# Patient Record
Sex: MALE | Race: BLACK OR AFRICAN AMERICAN | Employment: UNEMPLOYED | URBAN - METROPOLITAN AREA
[De-identification: names, ages, dates, MRNs, and addresses within clinical notes are randomized per-mention and may not be internally consistent; named-entity substitution may affect disease eponyms.]

---

## 2017-03-08 ENCOUNTER — OFFICE VISIT (OUTPATIENT)
Dept: CT IMAGING | Facility: CLINIC | Age: 32
End: 2017-03-08
Attending: INTERNAL MEDICINE
Payer: COMMERCIAL

## 2017-03-08 VITALS
SYSTOLIC BLOOD PRESSURE: 145 MMHG | TEMPERATURE: 98.1 F | OXYGEN SATURATION: 98 % | DIASTOLIC BLOOD PRESSURE: 61 MMHG | HEART RATE: 71 BPM

## 2017-03-08 DIAGNOSIS — A49.01 INFECTION DUE TO STAPHYLOCOCCUS AUREUS: Primary | ICD-10-CM

## 2017-03-08 LAB
CRP SERPL-MCNC: 14.8 MG/L (ref 0–8)
ERYTHROCYTE [SEDIMENTATION RATE] IN BLOOD BY WESTERGREN METHOD: 12 MM/H (ref 0–15)

## 2017-03-08 PROCEDURE — 85652 RBC SED RATE AUTOMATED: CPT | Performed by: INTERNAL MEDICINE

## 2017-03-08 PROCEDURE — 99211 OFF/OP EST MAY X REQ PHY/QHP: CPT | Mod: 25,ZF

## 2017-03-08 PROCEDURE — 99211 OFF/OP EST MAY X REQ PHY/QHP: CPT

## 2017-03-08 PROCEDURE — 36415 COLL VENOUS BLD VENIPUNCTURE: CPT | Mod: ZF

## 2017-03-08 PROCEDURE — 86140 C-REACTIVE PROTEIN: CPT | Performed by: INTERNAL MEDICINE

## 2017-03-08 ASSESSMENT — PAIN SCALES - GENERAL: PAINLEVEL: NO PAIN (0)

## 2017-03-08 NOTE — MR AVS SNAPSHOT
After Visit Summary   3/8/2017    Suraj Dobbins    MRN: [id number]           Patient Information     Date Of Birth          1985        Visit Information        Provider Department      3/8/2017 10:45 AM Gretchen Rodarte MD; [employer] [facility], [place], Infectious Disease        Today's Diagnoses     Infection due to Staphylococcus aureus    -  1       Follow-ups after your visit        Follow-up notes from your care team     Return if symptoms worsen or fail to improve.      Your next 10 appointments already scheduled     Mar 09, 2017  2:10 PM CST   XR PELVIS AND HIP RIGHT 1 VIEW with [id number]   [facility] XRay ([facility])    [address] [place] [id number] [phone]           Please bring a list of your current medicines to your exam. (Include vitamins, minerals and over-thecounter medicines.) Leave your valuables at home.  Tell your doctor if there is a chance you may be pregnant.  You do not need to do anything special for this exam.            Mar 09, 2017  2:15 PM CST   Return Visit with Yohan Pantoja MD, [employer]   [facility] ([facility])    [address] [place] [id number] [phone]              Who to contact     Please call your clinic at [phone] to:    Ask questions about your health    Make or cancel appointments    Discuss your medicines    Learn about your test results    Speak to your doctor   If you have compliments or concerns about an experience at your clinic, or if you wish to file a complaint, please contact [facility] Patient Relations at [phone] or email us at [email or web address]         Additional Information About Your Visit        MyChart Information     Holvi is an electronic gateway that provides easy, online access to your medical records. With  BRANDiD - Shop. Like a Man.hart, you can request a clinic appointment, read your test results, renew a prescription or communicate with your care team.     To sign up for [employer] visit the website at [email or web address]   You will be asked to enter the access code listed below, as well as some personal information. Please follow the directions to create your username and password.     Your access code is: [email or web address]  Expires: 2017  1:39 PM     Your access code will  in 90 days. If you need help or a new code, please contact your [facility] or call [phone] for assistance.        Care EveryWhere ID     This is your Care EveryWhere ID. This could be used by other organizations to access your [place] medical records  [id number]        Your Vitals Were     Pulse Temperature Pulse Oximetry             71 98.1  F (36.7  C) (Oral) 98%          Blood Pressure from Last 3 Encounters:   17 145/61   16 122/69   16 129/86    Weight from Last 3 Encounters:   16 69.9 kg (154 lb)   16 67.1 kg (148 lb)   16 70.3 kg (155 lb)              We Performed the Following     CRP inflammation     Erythrocyte sedimentation rate auto        Primary Care Provider Office Phone # Fax #    Alessandro Syed [phone]       [place] [address]        Thank you!     Thank you for choosing [facility], INFECTIOUS DISEASE  for your care. Our goal is always to provide you with excellent care. Hearing back from our patients is one way we can continue to improve our services. Please take a few minutes to complete the written survey that you may receive in the mail after your visit with us. Thank you!             Your Updated Medication List - Protect others around you: Learn how to safely use, store and throw away your medicines at www.disposemymeds.org.          This list is accurate as of: 3/8/17  3:47 PM.   Always use your most recent med list.                   Brand Name Dispense Instructions for use    loratadine 10 MG tablet    CLARITIN    30 tablet    Take 1 tablet (10 mg) by mouth daily

## 2017-03-08 NOTE — PROGRESS NOTES
ID Clinic Follow Up note    Assessment:  1. 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     His infection seems to be stable. His ESR is normal and CRP is near normal - notably both are stable after he stopped abx since his last visit. His perception is that the bacteria that caused his wound infection had been present chronically and I am inclined to agree. Fortunately his infection seems to have resolved, though I counseled him there is no way to be certain of this. In terms of proceeding with another corrective surgery - it seems reasonable to consider at this point as he has been off antibiotics for about 6 months and his infection occurred over a year ago in 1/2016. His CRP is up a little bit - unclear significance.     Imaging pre-operatively combined with cultures at the time of surgery, looking specifically for organisms that grew previously, may be helpful. Would also re-check CRP in 2-4 weeks to assess trend.    Plan:  -continue off antibiotics  -re-eheck CRP and ERS in 2-4 weeks    Visit length 25 min, >50% counseling/care coordination    Gretchen Rodarte MD  pgr [phone]      HPI:  Mr. Dobbins is a 32 y/o man who sustained a devastating R hip injury as a child in [facility]. Her reports this was chronically painful. He ultimately underwent complex femoral revision osetotomy on 12/7/15. This was complicated by infection requiring I/D that revealed MSSA on 1/19/16 and 1/21/16 and also P acnes and Actinomyces on 2/7/16. He was treated with IV therapy (nafcillin) and ultimately transitioned to oral suppressive minocycline and rifampin. He was last seen by Dr. Arciniega on 9/21. At that time his inflammatory markers were approaching normal and he felt well. His suppressive therapy was stopped shortly after that visit.     He  now presents for routine follow-up. I last saw him in 9/2016. History is obtained with the assistance of an . In the interim, Mr. Dobbins has done well. He reports no pain and no drainage from the hip site.     We talked at length about the fact that persistent infection can never be completely excluded, but arguing against it in this case are his significantly improved symptoms  - he feels better now than he even did for years pre-operatively - and his stable inflammatory markers.    Past Medical History   Diagnosis Date     Deformity of right lower extremity      Tuberculosis of hip (H)      Underwent 8 months of therapy in an [place] refugee [place]     Social History     Social History     Marital status: Single     Spouse name: N/A     Number of children: N/A     Years of education: N/A     Occupational History     Not on file.     Social History Main Topics     Smoking status: Never Smoker     Smokeless tobacco: Never Used     Alcohol use No     Drug use: No     Sexual activity: Not on file     Other Topics Concern     Not on file     Social History Narrative     No family history on file.      Current Outpatient Prescriptions on File Prior to Visit:  loratadine (CLARITIN) 10 MG tablet Take 1 tablet (10 mg) by mouth daily     No current facility-administered medications on file prior to visit.     Exam:  /61  Pulse 71  Temp 98.1  F (36.7  C) (Oral)  SpO2 98%  Awake, alert, pleasant   HR regular  Breathing normal  No skin rash  R hip incisions examined, no erythema, induration, or incisional dehisence  Marked leg length discrepancy with R leg at least 8in shorter than the R.    Data:  Results for CHANDRIKA DOBBINS (MRN [id number]) as of 3/8/2017 15:45   Ref. Range 1/26/2016 06:20 2/9/2016 16:45 2/10/2016 02:14 2/11/2016 06:30 2/15/2016 02:06 2/22/2016 02:15 2/26/2016 06:20 3/4/2016 06:35 3/5/2016 07:00 3/6/2016 06:55 3/11/2016 05:44 3/23/2016 11:40 6/22/2016 15:00 9/21/2016 15:10 12/21/2016 11:40  3/8/2017 11:57   CRP Inflammation Latest Ref Range: 0.0 - 8.0 mg/L 60.5 (H) 40.3 (H) 34.4 (H) 30.5 (H) 41.8 (H) 22.8 (H) 31.5 (H) 22.1 (H) 22.1 (H) 20.0 (H) 18.0 (H) 19.3 (H) 10.7 (H) 11.9 (H) 12.1 (H) 14.8 (H)     Results for CHANDRIKA RETANA (MRN [id number]) as of 3/8/2017 15:45   Ref. Range 1/16/2016 02:38 1/20/2016 05:59 3/4/2016 06:35 3/11/2016 05:44 6/22/2016 15:00 9/21/2016 15:10 12/21/2016 11:40 3/8/2017 11:57   Sed Rate Latest Ref Range: 0 - 15 mm/h 108 (H) 122 (H) 42 (H) 24 (H) 14 10 12 12

## 2017-03-08 NOTE — NURSING NOTE
Patient is in clinic today for a follow up with Dr. Rodarte for his right foot. Patient states pain level is at a 0 out of 10. Vitals taken, medication and allergies were reviewed. Patient state that he feels it's getting better. An  is also present at today's visit.    Katie Retana CMA

## 2017-03-09 ENCOUNTER — OFFICE VISIT (OUTPATIENT)
Dept: ORTHOPEDICS | Facility: CLINIC | Age: 32
End: 2017-03-09

## 2017-03-09 DIAGNOSIS — M21.70 LEG LENGTH DIFFERENCE, ACQUIRED: Primary | ICD-10-CM

## 2017-03-09 NOTE — PROGRESS NOTES
HISTORY OF PRESENT ILLNESS:  Suraj is a pleasant 32-year-old male who returns approximately 14 months status post right femoral osteotomy.  He is, in fact, doing very well.  He continues to have a mild pain in the right foot associated with correction of his extreme equinovarus.  However, otherwise he is doing well.  Pain is minimal and he is actually able to ambulate nearly independently with a single cane.      PHYSICAL EXAMINATION:  Well-nourished male, ambulating with assistance of a single crutch.  Surgical incisions are very well-healed and he is neurovascularly intact distally.      X-rays show a well-healed callus at the fracture site.      ASSESSMENT:  Status post right femoral osteotomy.        PLAN:  Suraj is doing very well status post the above-mentioned procedure.  We did discuss the sequelae of his significant deformity of his right knee.  The patient is not a candidate at the current time for a right total knee arthroplasty given he is having no pain.  We did discuss that he may be a candidate for a right total knee in the future when he does develop pain and this will simply correct range of motion as well as pain.  He will follow up in the future on an as needed basis.       Answers for HPI/ROS submitted by the patient on 3/9/2017   General Symptoms: No  Skin Symptoms: No  HENT Symptoms: No  EYE SYMPTOMS: No  HEART SYMPTOMS: No  LUNG SYMPTOMS: No  INTESTINAL SYMPTOMS: No  URINARY SYMPTOMS: No  REPRODUCTIVE SYMPTOMS: No  SKELETAL SYMPTOMS: No  BLOOD SYMPTOMS: No  NERVOUS SYSTEM SYMPTOMS: No  MENTAL HEALTH SYMPTOMS: No

## 2017-03-09 NOTE — LETTER
3/9/2017      RE: Suraj ROGERS Mu  [address]    [place] [address]       HISTORY OF PRESENT ILLNESS:  Suraj is a pleasant 32-year-old male who returns approximately 14 months status post right femoral osteotomy.  He is, in fact, doing very well.  He continues to have a mild pain in the right foot associated with correction of his extreme equinovarus.  However, otherwise he is doing well.  Pain is minimal and he is actually able to ambulate nearly independently with a single cane.      PHYSICAL EXAMINATION:  Well-nourished male, ambulating with assistance of a single crutch.  Surgical incisions are very well-healed and he is neurovascularly intact distally.      X-rays show a well-healed callus at the fracture site.      ASSESSMENT:  Status post right femoral osteotomy.        PLAN:  Suraj is doing very well status post the above-mentioned procedure.  We did discuss the sequelae of his significant deformity of his right knee.  The patient is not a candidate at the current time for a right total knee arthroplasty given he is having no pain.  We did discuss that he may be a candidate for a right total knee in the future when he does develop pain and this will simply correct range of motion as well as pain.  He will follow up in the future on an as needed basis.         Yohan Pantoja MD

## 2017-03-09 NOTE — MR AVS SNAPSHOT
After Visit Summary   3/9/2017    Suraj ROGERS Mu    MRN: [id number]           Patient Information     Date Of Birth          1985        Visit Information        Provider Department      3/9/2017 2:15 PM Yohan Pantoja MD; [employer] [facility]        Today's Diagnoses     Leg length difference, acquired    -  1       Follow-ups after your visit        Who to contact     Please call your clinic at [phone] to:    Ask questions about your health    Make or cancel appointments    Discuss your medicines    Learn about your test results    Speak to your doctor   If you have compliments or concerns about an experience at your clinic, or if you wish to file a complaint, please contact [facility] Patient Relations at [phone] or email us at [email or web address]         Additional Information About Your Visit        MyChart Information     OpenDoort is an electronic gateway that provides easy, online access to your medical records. With Experience Headphones, you can request a clinic appointment, read your test results, renew a prescription or communicate with your care team.     To sign up for OpenDoort visit the website at [email or web address]   You will be asked to enter the access code listed below, as well as some personal information. Please follow the directions to create your username and password.     Your access code is: [email or web address]  Expires: 2017  9:59 AM     Your access code will  in 90 days. If you need help or a new code, please contact your [facility] or call [phone] for assistance.        Care EveryWhere ID     This is your Care EveryWhere ID. This could be used by other organizations to access your [place] medical records  [id number]         Blood Pressure from Last 3 Encounters:   17 145/61   16 122/69   16 129/86    Weight from Last 3 Encounters:    09/21/16 69.9 kg (154 lb)   06/22/16 67.1 kg (148 lb)   03/23/16 70.3 kg (155 lb)              Today, you had the following     No orders found for display       Primary Care Provider Office Phone # Fax #    Alessandro Syed [phone]       [place] [address]        Equal Access to Services     ANGELA RODRIGUEZ : Hadii aad ku hadasho Soomaali, waaxda luqadaha, qaybta kaalmada adeegyada, waxay abdiin hayhannahn luc taylor . So [facility] [phone].    ATENCIÓN: Si habla jose, tiene a vasquez disposición servicios gratuitos de asistencia lingüística. Llame al [phone].    We comply with applicable federal civil rights laws and [place] laws. We do not discriminate on the basis of race, color, national origin, age, disability sex, sexual orientation or gender identity.            Thank you!     Thank you for choosing [facility]  for your care. Our goal is always to provide you with excellent care. Hearing back from our patients is one way we can continue to improve our services. Please take a few minutes to complete the written survey that you may receive in the mail after your visit with us. Thank you!             Your Updated Medication List - Protect others around you: Learn how to safely use, store and throw away your medicines at www.disposemymeds.org.          This list is accurate as of: 3/9/17 11:59 PM.  Always use your most recent med list.                   Brand Name Dispense Instructions for use Diagnosis    loratadine 10 MG tablet    CLARITIN    30 tablet    Take 1 tablet (10 mg) by mouth daily    Seasonal allergic rhinitis

## 2017-03-09 NOTE — LETTER
3/9/2017       RE: Suraj Dobbins  [address]    [place] [address]     Dear Colleague,    Thank you for referring your patient, Suraj Dobbins, to the [facility]. Please see a copy of my visit note below.     Dictation on: 03/09/2017  3:33 PM by: BREANNA KNIGHT [[email or web address]]       Answers for HPI/ROS submitted by the patient on 3/9/2017   General Symptoms: No  Skin Symptoms: No  HENT Symptoms: No  EYE SYMPTOMS: No  HEART SYMPTOMS: No  LUNG SYMPTOMS: No  INTESTINAL SYMPTOMS: No  URINARY SYMPTOMS: No  REPRODUCTIVE SYMPTOMS: No  SKELETAL SYMPTOMS: No  BLOOD SYMPTOMS: No  NERVOUS SYSTEM SYMPTOMS: No  MENTAL HEALTH SYMPTOMS: No      HISTORY OF PRESENT ILLNESS:  Suraj is a pleasant 32-year-old male who returns approximately 14 months status post right femoral osteotomy.  He is, in fact, doing very well.  He continues to have a mild pain in the right foot associated with correction of his extreme equinovarus.  However, otherwise he is doing well.  Pain is minimal and he is actually able to ambulate nearly independently with a single cane.      PHYSICAL EXAMINATION:  Well-nourished male, ambulating with assistance of a single crutch.  Surgical incisions are very well-healed and he is neurovascularly intact distally.      X-rays show a well-healed callus at the fracture site.      ASSESSMENT:  Status post right femoral osteotomy.        PLAN:  Suraj is doing very well status post the above-mentioned procedure.  We did discuss the sequelae of his significant deformity of his right knee.  The patient is not a candidate at the current time for a right total knee arthroplasty given he is having no pain.  We did discuss that he may be a candidate for a right total knee in the future when he does develop pain and this will simply correct range of motion as well as pain.  He will follow up in the future on an as needed basis.      Again,  thank you for allowing me to participate in the care of your patient.      Sincerely,    Yohan Pantoja MD

## 2017-08-15 ENCOUNTER — TELEPHONE (OUTPATIENT)
Dept: ORTHOPEDICS | Facility: CLINIC | Age: 32
End: 2017-08-15

## 2017-08-15 NOTE — TELEPHONE ENCOUNTER
View2Gether message from the call center:    Patient states that he is in extreme pain and needs to be seen sooner with Dr. Pantoja than what was available.  Pt is now scheduled for 10/05/2017.  I was not able to Triage pt, hold was 10 minutes.  Pt would like a call back tomorrow, and indicates that transportation will also have to be arranged.  His number is [phone].     Pt was phoned and message left that he can see Gabi Dc NP for Dr Pantoja and she is available Mondays and Thursdays.  The message included the scheduling line phone number.  Adeline Pittman RN

## 2017-08-23 ENCOUNTER — TELEPHONE (OUTPATIENT)
Dept: ORTHOPEDICS | Facility: CLINIC | Age: 32
End: 2017-08-23

## 2017-08-23 NOTE — TELEPHONE ENCOUNTER
"Suraj called,  joined call.  Suraj states he wants appointment with Dr Pantoja, informed him he is scheduled 10/05.  Offered patient appointment with Gabi Dc NP, he did not want to see her.  \"Tell him I just need physical therapy before I see him\".  I informed Suraj an Order can not be placed until he is assessed.    Updated forwarded to Dr Pantoja's cc BARRON Lr.  Courtney Crane RN 9:45 AM 8/23/2017        "

## 2017-08-23 NOTE — TELEPHONE ENCOUNTER
Suraj was phoned regarding his request for [employer] transportation to be set up for his 10/5/17 appointment AND his earlier request for P.T order.   Pt explained that he had continued right leg pain, he wanted referral for neurologist and we discussed the need for Dr Pantoja to assess if he needs the referral.  When I asked Suraj about P.T, he said he wanted to wait until he saw Dr Pantoja.  [employer] transportation was phoned [phone], they will not schedule October transportation until 9/26/17, and it must be at least 48 hours before the needed appt.  RN will call during that window to assure Suraj has transportation to clinic.

## 2017-10-03 DIAGNOSIS — M25.551 HIP PAIN, RIGHT: Primary | ICD-10-CM

## 2018-03-28 ENCOUNTER — OFFICE VISIT (OUTPATIENT)
Dept: INTERNAL MEDICINE | Facility: CLINIC | Age: 33
End: 2018-03-28
Payer: MEDICAID

## 2018-03-28 ENCOUNTER — RADIANT APPOINTMENT (OUTPATIENT)
Dept: GENERAL RADIOLOGY | Facility: CLINIC | Age: 33
End: 2018-03-28
Attending: INTERNAL MEDICINE
Payer: MEDICAID

## 2018-03-28 VITALS
DIASTOLIC BLOOD PRESSURE: 70 MMHG | WEIGHT: 240 LBS | HEART RATE: 73 BPM | OXYGEN SATURATION: 97 % | BODY MASS INDEX: 29.22 KG/M2 | HEIGHT: 76 IN | SYSTOLIC BLOOD PRESSURE: 120 MMHG | TEMPERATURE: 97.9 F

## 2018-03-28 DIAGNOSIS — Z23 NEED FOR TD VACCINE: ICD-10-CM

## 2018-03-28 DIAGNOSIS — M79.89 FINGER SWELLING: ICD-10-CM

## 2018-03-28 DIAGNOSIS — Z00.00 ENCOUNTER FOR ROUTINE ADULT HEALTH EXAMINATION WITHOUT ABNORMAL FINDINGS: Primary | ICD-10-CM

## 2018-03-28 DIAGNOSIS — Z11.1 SCREENING EXAMINATION FOR PULMONARY TUBERCULOSIS: ICD-10-CM

## 2018-03-28 DIAGNOSIS — Z23 NEED FOR TDAP VACCINATION: ICD-10-CM

## 2018-03-28 LAB — HGB BLD-MCNC: 13.8 G/DL (ref 13.3–17.7)

## 2018-03-28 PROCEDURE — 85018 HEMOGLOBIN: CPT | Performed by: INTERNAL MEDICINE

## 2018-03-28 PROCEDURE — 80053 COMPREHEN METABOLIC PANEL: CPT | Performed by: INTERNAL MEDICINE

## 2018-03-28 PROCEDURE — 73140 X-RAY EXAM OF FINGER(S): CPT | Mod: RT

## 2018-03-28 PROCEDURE — 99385 PREV VISIT NEW AGE 18-39: CPT | Performed by: INTERNAL MEDICINE

## 2018-03-28 PROCEDURE — 86480 TB TEST CELL IMMUN MEASURE: CPT | Performed by: INTERNAL MEDICINE

## 2018-03-28 PROCEDURE — 90715 TDAP VACCINE 7 YRS/> IM: CPT | Performed by: INTERNAL MEDICINE

## 2018-03-28 PROCEDURE — 36415 COLL VENOUS BLD VENIPUNCTURE: CPT | Performed by: INTERNAL MEDICINE

## 2018-03-28 PROCEDURE — 80061 LIPID PANEL: CPT | Performed by: INTERNAL MEDICINE

## 2018-03-28 NOTE — PROGRESS NOTES
SUBJECTIVE:   CC: Ilan Douglas is an 33 year old male who presents for preventative health visit.     Healthy Habits:    Do you get at least three servings of calcium containing foods daily (dairy, green leafy vegetables, etc.)? yes    Amount of exercise or daily activities, outside of work: 4 day(s) per week    Problems taking medications regularly not applicable    Medication side effects: N/A    Have you had an eye exam in the past two years? Yes    Do you see a dentist twice per year? yes    Do you have sleep apnea, excessive snoring or daytime drowsiness?no     Pt also c/o hurting finger while playing basket ball in 10/17 , did not seek medical care at that time. Patient again injured the same finger about 4 weeks ago, has noticed slight swelling on the PIP joint and some tenderness. No tingling or numbness. Is able to bend.    Patient also needs physical and immunization forms filled for school.      Today's PHQ-2 Score: 0  PHQ-2 ( 1999 Pfizer) 3/28/2018   Q1: Little interest or pleasure in doing things 0   Q2: Feeling down, depressed or hopeless 0   PHQ-2 Score 0       Abuse: Current or Past(Physical, Sexual or Emotional)- No  Do you feel safe in your environment - Yes      History reviewed. No pertinent past medical history.    History reviewed. No pertinent surgical history.    No current outpatient prescriptions on file.       Family History   Problem Relation Age of Onset     Family history unknown: Yes       Social History   Substance Use Topics     Smoking status: Never Smoker     Smokeless tobacco: Never Used     Alcohol use No      If you drink alcohol do you typically have >3 drinks per day or >7 drinks per week? No                      Last PSA: No results found for: PSA    Reviewed orders with patient. Reviewed health maintenance and updated orders accordingly - Yes      Reviewed and updated as needed this visit by clinical staff  Tobacco  Allergies  Meds  Med Hx  Surg Hx  Fam Hx  Soc Hx     "    Reviewed and updated as needed this visit by Provider            ROS:  C: NEGATIVE for fever, chills, change in weight  I: NEGATIVE for worrisome rashes, moles or lesions  E: NEGATIVE for vision changes or irritation  ENT: NEGATIVE for ear, mouth and throat problems  R: NEGATIVE for significant cough or SOB  CV: NEGATIVE for chest pain, palpitations or peripheral edema  GI: NEGATIVE for nausea, abdominal pain, heartburn, or change in bowel habits   male: negative for dysuria, hematuria, decreased urinary stream, erectile dysfunction, urethral discharge  M: rt finger pain other wise NEGATIVE for significant arthralgias or myalgia  N: NEGATIVE for weakness, dizziness or paresthesias  P: NEGATIVE for changes in mood or affect    OBJECTIVE:   /70  Pulse 73  Temp 97.9  F (36.6  C) (Oral)  Ht 6' 4\" (1.93 m)  Wt 240 lb (108.9 kg)  SpO2 97%  BMI 29.21 kg/m2  EXAM:  GENERAL: healthy, alert and no distress  EYES: Eyes grossly normal to inspection, PERRL and conjunctivae and sclerae normal  HENT: ear canals and TM's normal, nose and mouth without ulcers or lesions  NECK: no adenopathy, no asymmetry, masses, or scars and thyroid normal to palpation  RESP: lungs clear to auscultation - no rales, rhonchi or wheezes  CV: regular rate and rhythm, normal S1 S2, no S3 or S4, no murmur, click or rub, no peripheral edema and peripheral pulses strong  ABDOMEN: soft, nontender, no hepatosplenomegaly, no masses and bowel sounds normal  MS: mild swelling and tenderness on PCP joint rt  ring finger , range of motion normal  EXT ;no edema, no cough, tenderness  NEURO: Normal strength and tone, mentation intact and speech normal  PSYCH: mentation appears normal, affect normal/bright    ASSESSMENT/PLAN:     (Z00.00) Encounter for routine adult health examination without abnormal findings  (primary encounter diagnosis)  Plan: Hemoglobin, Comprehensive metabolic panel,         Lipid panel reflex to direct LDL Fasting        " "  (Z11.1) Screening examination for pulmonary tuberculosis  Plan: M Tuberculosis by Quantiferon          (M79.89) Finger swelling  Plan: XR Finger Right G/E 2 Views          (Z23) Need for Tdap vaccination  Plan: TDAP VACCINE (ADACEL)            COUNSELING:  Reviewed preventive health counseling, as reflected in patient instructions       Regular exercise       Healthy diet/nutrition       Immunizations    Vaccinated for: TDAP           reports that he has never smoked. He has never used smokeless tobacco.    Estimated body mass index is 29.21 kg/(m^2) as calculated from the following:    Height as of this encounter: 6' 4\" (1.93 m).    Weight as of this encounter: 240 lb (108.9 kg).   Weight management plan: Discussed healthy diet and exercise guidelines and patient will follow up in 12 months in clinic to re-evaluate.    Counseling Resources:  ATP IV Guidelines  Pooled Cohorts Equation Calculator  FRAX Risk Assessment  ICSI Preventive Guidelines  Dietary Guidelines for Americans, 2010  USDA's MyPlate  ASA Prophylaxis  Lung CA Screening    Venita Llamas MD  [facility]  "

## 2018-03-29 LAB
ALBUMIN SERPL-MCNC: 4.2 G/DL (ref 3.4–5)
ALP SERPL-CCNC: 85 U/L (ref 40–150)
ALT SERPL W P-5'-P-CCNC: 47 U/L (ref 0–70)
ANION GAP SERPL CALCULATED.3IONS-SCNC: 6 MMOL/L (ref 3–14)
AST SERPL W P-5'-P-CCNC: 28 U/L (ref 0–45)
BILIRUB SERPL-MCNC: 0.3 MG/DL (ref 0.2–1.3)
BUN SERPL-MCNC: 18 MG/DL (ref 7–30)
CALCIUM SERPL-MCNC: 8.7 MG/DL (ref 8.5–10.1)
CHLORIDE SERPL-SCNC: 106 MMOL/L (ref 94–109)
CHOLEST SERPL-MCNC: 184 MG/DL
CO2 SERPL-SCNC: 26 MMOL/L (ref 20–32)
CREAT SERPL-MCNC: 0.9 MG/DL (ref 0.66–1.25)
GFR SERPL CREATININE-BSD FRML MDRD: >90 ML/MIN/1.7M2
GLUCOSE SERPL-MCNC: 86 MG/DL (ref 70–99)
HDLC SERPL-MCNC: 44 MG/DL
LDLC SERPL CALC-MCNC: 119 MG/DL
NONHDLC SERPL-MCNC: 140 MG/DL
POTASSIUM SERPL-SCNC: 3.8 MMOL/L (ref 3.4–5.3)
PROT SERPL-MCNC: 7.3 G/DL (ref 6.8–8.8)
SODIUM SERPL-SCNC: 138 MMOL/L (ref 133–144)
TRIGL SERPL-MCNC: 107 MG/DL

## 2018-03-30 LAB
M TB TUBERC IFN-G BLD QL: NEGATIVE
M TB TUBERC IFN-G/MITOGEN IGNF BLD: 0.01 IU/ML

## 2018-04-03 ENCOUNTER — TELEPHONE (OUTPATIENT)
Dept: INTERNAL MEDICINE | Facility: CLINIC | Age: 33
End: 2018-04-03

## 2018-04-03 NOTE — TELEPHONE ENCOUNTER
Forms for [facility] completed and mailed to patient home.  Left message to advise patient.  Copy sent to scan into [facility].

## 2018-09-27 ENCOUNTER — OFFICE VISIT (OUTPATIENT)
Dept: INTERNAL MEDICINE | Facility: CLINIC | Age: 33
End: 2018-09-27
Payer: COMMERCIAL

## 2018-09-27 ENCOUNTER — OFFICE VISIT (OUTPATIENT)
Dept: ORTHOPEDICS | Facility: CLINIC | Age: 33
End: 2018-09-27
Payer: COMMERCIAL

## 2018-09-27 VITALS
TEMPERATURE: 98.6 F | SYSTOLIC BLOOD PRESSURE: 108 MMHG | BODY MASS INDEX: 28.49 KG/M2 | RESPIRATION RATE: 12 BRPM | HEART RATE: 72 BPM | OXYGEN SATURATION: 98 % | HEIGHT: 76 IN | WEIGHT: 234 LBS | DIASTOLIC BLOOD PRESSURE: 62 MMHG

## 2018-09-27 VITALS — WEIGHT: 234 LBS | SYSTOLIC BLOOD PRESSURE: 120 MMHG | BODY MASS INDEX: 28.48 KG/M2 | DIASTOLIC BLOOD PRESSURE: 76 MMHG

## 2018-09-27 DIAGNOSIS — J06.9 VIRAL UPPER RESPIRATORY TRACT INFECTION: Primary | ICD-10-CM

## 2018-09-27 DIAGNOSIS — S63.638A: Primary | ICD-10-CM

## 2018-09-27 PROCEDURE — 99213 OFFICE O/P EST LOW 20 MIN: CPT | Performed by: NURSE PRACTITIONER

## 2018-09-27 PROCEDURE — 99203 OFFICE O/P NEW LOW 30 MIN: CPT | Performed by: ORTHOPAEDIC SURGERY

## 2018-09-27 NOTE — PROGRESS NOTES
"  SUBJECTIVE:   Ilan Douglas is a 33 year old male who presents to clinic today for the following health issues:    Cough, headache, runny nose, some sore throat. Sx present x 3 or 4 days.   Body ache   Fever - has not checked temperature but felt very warm   Ear pain at times  Sneezing   Cough - non productive    Helps to drink warm fluids     Lower leg pain in back of leg   Clicking noise when he gets up            Problem list and histories reviewed & adjusted, as indicated.  Additional history: as documented    There is no problem list on file for this patient.    No past surgical history on file.    Social History   Substance Use Topics     Smoking status: Never Smoker     Smokeless tobacco: Never Used     Alcohol use No     Family History   Problem Relation Age of Onset     Family history unknown: Yes           Reviewed and updated as needed this visit by clinical staff  Tobacco  Allergies  Meds  Soc Hx      Reviewed and updated as needed this visit by Provider         ROS:  Constitutional, HEENT, cardiovascular, pulmonary, gi and gu systems are negative, except as otherwise noted.    OBJECTIVE:     /62  Pulse 72  Temp 98.6  F (37  C) (Oral)  Resp 12  Ht 6' 4\" (1.93 m)  Wt 234 lb (106.1 kg)  SpO2 98%  BMI 28.48 kg/m2  Body mass index is 28.48 kg/(m^2).  GENERAL: alert and no distress- no cough during entire visit   HENT: ear canals and TM's normal, nose and mouth without ulcers or lesions  RESP: lungs clear to auscultation - no rales, rhonchi or wheezes  CV: regular rate and rhythm  MS: no gross musculoskeletal defects noted, no edema  NEURO: Normal strength and tone, mentation intact and speech normal  PSYCH: mentation appears normal, affect normal/bright    Diagnostic Test Results:  none     ASSESSMENT/PLAN:             1. Viral upper respiratory tract infection        Patient Instructions   Keep fluids up     ibuprofen or tylenol as needed for comfort      AUREA Burns " [facility]

## 2018-09-27 NOTE — PROGRESS NOTES
HISTORY OF PRESENT ILLNESS:    Ilan Douglas is a 33 year old male who is seen in consultation at the request of Dr. Llamas for right 4th finger swelling and pain. Patient reports he injured his right 4th finger while playing basketball last spring, he noted he had X-rays done after the initial injury. He notes the PIP joint is  and swollen. Patient states he is having mild pain in the right thumb, that started this summer.  He states the thumb is not too sore and that he has full ROM of the thumb.   Patient would also like his left lower leg evaluated, he notes he has had multiple ankle sprains and has lateral lower leg pain.  Patient states the pain comes and goes. Patient states it swells intermittently, and with ice the swelling will subside. The area is tender to the touch. Patient is very active playing recreational basketball and soccer.   Treatments tried to this point: ice, coban  Orthopedic PMH: none   No past medical history on file.    No past surgical history on file.    Family History   Problem Relation Age of Onset     Family history unknown: Yes       Social History     Social History     Marital status:      Spouse name: N/A     Number of children: N/A     Years of education: N/A     Occupational History     Not on file.     Social History Main Topics     Smoking status: Never Smoker     Smokeless tobacco: Never Used     Alcohol use No     Drug use: No     Sexual activity: Yes     Partners: Female     Other Topics Concern     Not on file     Social History Narrative       No current outpatient prescriptions on file.       No Known Allergies    REVIEW OF SYSTEMS:  CONSTITUTIONAL:  NEGATIVE for fever, chills, change in weight  INTEGUMENTARY/SKIN:  NEGATIVE for worrisome rashes, moles or lesions  EYES:  NEGATIVE for vision changes or irritation  ENT/MOUTH:  NEGATIVE for ear, mouth and throat problems  RESP:  NEGATIVE for significant cough or SOB  BREAST:  NEGATIVE for masses,  tenderness or discharge  CV:  NEGATIVE for chest pain, palpitations or peripheral edema  GI:  NEGATIVE for nausea, abdominal pain, heartburn, or change in bowel habits  :  Negative   MUSCULOSKELETAL:  See HPI above  NEURO:  NEGATIVE for weakness, dizziness or paresthesias  ENDOCRINE:  NEGATIVE for temperature intolerance, skin/hair changes  HEME/ALLERGY/IMMUNE:  NEGATIVE for bleeding problems  PSYCHIATRIC:  NEGATIVE for changes in mood or affect      PHYSICAL EXAM:  /76 (BP Location: Right arm, Patient Position: Chair, Cuff Size: Adult Large)  Wt 234 lb (106.1 kg)  BMI 28.48 kg/m2  Body mass index is 28.48 kg/(m^2).   GENERAL APPEARANCE: healthy, alert and no distress   SKIN: no suspicious lesions or rashes  NEURO: Normal strength and tone, mentation intact and speech normal  VASCULAR: Good pulses, and capillary refill   LYMPH: no lymphadenopathy   PSYCH:  mentation appears normal and affect normal/bright    MSK:  Examination of the hands reveal ring finger PIP joint swelling.  There is no ligamentous laxity.  He has full range of motion.  CMS is intact to his fingertips.     ASSESSMENT / PLAN: Sprain bilateral ring fingers from playing basketball.  We will treat this symptomatically.      Imaging Interpretation:         Eric Nguyen MD  Department of Orthopedic Surgery

## 2018-09-27 NOTE — LETTER
9/27/2018         RE: Il[facility]  [address] [facility] [address]        Dear Colleague,    Thank you for referring your patient, Ilan Douglas, to the [facility]. Please see a copy of my visit note below.    HISTORY OF PRESENT ILLNESS:    Ilan Douglas is a 33 year old male who is seen in consultation at the request of Dr. Llamas for right 4th finger swelling and pain. Patient reports he injured his right 4th finger while playing basketball last spring, he noted he had X-rays done after the initial injury. He notes the PIP joint is  and swollen. Patient states he is having mild pain in the right thumb, that started this summer.  He states the thumb is not too sore and that he has full ROM of the thumb.   Patient would also like his left lower leg evaluated, he notes he has had multiple ankle sprains and has lateral lower leg pain.  Patient states the pain comes and goes. Patient states it swells intermittently, and with ice the swelling will subside. The area is tender to the touch. Patient is very active playing recreational basketball and soccer.   Treatments tried to this point: ice, coban  Orthopedic PMH: none   No past medical history on file.    No past surgical history on file.    Family History   Problem Relation Age of Onset     Family history unknown: Yes       Social History     Social History     Marital status:      Spouse name: N/A     Number of children: N/A     Years of education: N/A     Occupational History     Not on file.     Social History Main Topics     Smoking status: Never Smoker     Smokeless tobacco: Never Used     Alcohol use No     Drug use: No     Sexual activity: Yes     Partners: Female     Other Topics Concern     Not on file     Social History Narrative       No current outpatient prescriptions on file.       No Known Allergies    REVIEW OF SYSTEMS:  CONSTITUTIONAL:  NEGATIVE for fever, chills, change in  weight  INTEGUMENTARY/SKIN:  NEGATIVE for worrisome rashes, moles or lesions  EYES:  NEGATIVE for vision changes or irritation  ENT/MOUTH:  NEGATIVE for ear, mouth and throat problems  RESP:  NEGATIVE for significant cough or SOB  BREAST:  NEGATIVE for masses, tenderness or discharge  CV:  NEGATIVE for chest pain, palpitations or peripheral edema  GI:  NEGATIVE for nausea, abdominal pain, heartburn, or change in bowel habits  :  Negative   MUSCULOSKELETAL:  See HPI above  NEURO:  NEGATIVE for weakness, dizziness or paresthesias  ENDOCRINE:  NEGATIVE for temperature intolerance, skin/hair changes  HEME/ALLERGY/IMMUNE:  NEGATIVE for bleeding problems  PSYCHIATRIC:  NEGATIVE for changes in mood or affect      PHYSICAL EXAM:  /76 (BP Location: Right arm, Patient Position: Chair, Cuff Size: Adult Large)  Wt 234 lb (106.1 kg)  BMI 28.48 kg/m2  Body mass index is 28.48 kg/(m^2).   GENERAL APPEARANCE: healthy, alert and no distress   SKIN: no suspicious lesions or rashes  NEURO: Normal strength and tone, mentation intact and speech normal  VASCULAR: Good pulses, and capillary refill   LYMPH: no lymphadenopathy   PSYCH:  mentation appears normal and affect normal/bright    MSK:  Examination of the hands reveal ring finger PIP joint swelling.  There is no ligamentous laxity.  He has full range of motion.  CMS is intact to his fingertips.     ASSESSMENT / PLAN: Sprain bilateral ring fingers from playing basketball.  We will treat this symptomatically.      Imaging Interpretation:         Eric Nguyen MD  Department of Orthopedic Surgery          Again, thank you for allowing me to participate in the care of your patient.        Sincerely,        Sukhwinder Nguyen MD

## 2018-09-27 NOTE — MR AVS SNAPSHOT
After Visit Summary   9/27/2018    Ilan Douglas    MRN: [id number]           Patient Information     Date Of Birth          1985        Visit Information        Provider Department      9/27/2018 2:00 PM Sukhwinder Nguyen MD [facility] ORTHOPEDIC SURGERY        Today's Diagnoses     Sprain of interphalangeal joint of ring finger, unspecified laterality, initial encounter    -  1       Follow-ups after your visit        Who to contact     If you have questions or need follow up information about today's clinic visit or your schedule please contact [facility] ORTHOPEDIC SURGERY directly at [phone].  Normal or non-critical lab and imaging results will be communicated to you by MyChart, letter or phone within 4 business days after the clinic has received the results. If you do not hear from us within 7 days, please contact the clinic through MyChart or phone. If you have a critical or abnormal lab result, we will notify you by phone as soon as possible.  Submit refill requests through [employer] or call your pharmacy and they will forward the refill request to us. Please allow 3 business days for your refill to be completed.          Additional Information About Your Visit        Care EveryWhere ID     This is your Care EveryWhere ID. This could be used by other organizations to access your [place] medical records  [id number]        Your Vitals Were     BMI (Body Mass Index)                   28.48 kg/m2            Blood Pressure from Last 3 Encounters:   09/27/18 120/76   09/27/18 108/62   03/28/18 120/70    Weight from Last 3 Encounters:   09/27/18 234 lb (106.1 kg)   09/27/18 234 lb (106.1 kg)   03/28/18 240 lb (108.9 kg)              Today, you had the following     No orders found for display       Primary Care Provider Office Phone # Fax #    Chinedunakumari PARAG Llamas [phone]       [address]  [facility] [address]        Equal Access to Services      [place] : Hadii aad alex sonia Ag, wakelseyda luqadaha, qaybta kaalmada roseannaaipaola, davide vasquezmaciejbela villanueva. So [facility] [place] [facility] [phone].    ATENCIÓN: Si habla español, tiene a marie disposición servicios gratuitos de asistencia lingüística. Llame al [phone].    We comply with applicable federal civil rights laws and [place] laws. We do not discriminate on the basis of race, color, national origin, age, disability, sex, sexual orientation, or gender identity.            Thank you!     Thank you for choosing [facility] ORTHOPEDIC SURGERY  for your care. Our goal is always to provide you with excellent care. Hearing back from our patients is one way we can continue to improve our services. Please take a few minutes to complete the written survey that you may receive in the mail after your visit with us. Thank you!             Your Updated Medication List - Protect others around you: Learn how to safely use, store and throw away your medicines at www.disposemymeds.org.      Notice  As of 9/27/2018 11:59 PM    You have not been prescribed any medications.

## 2018-09-27 NOTE — MR AVS SNAPSHOT
"              After Visit Summary   9/27/2018    Ilan Douglas    MRN: [id number]           Patient Information     Date Of Birth          1985        Visit Information        Provider Department      9/27/2018 1:00 PM Jeimy Doyle APRN CNP [facility]        Care Instructions    Keep fluids up     ibuprofen or tylenol as needed for comfort          Follow-ups after your visit        Your next 10 appointments already scheduled     Sep 27, 2018  2:00 PM CDT   New Visit with Sukhwinder Nguyen MD   [facility] ([facility])    [address] [facility]  [address] [place] [address]   [phone]              Who to contact     If you have questions or need follow up information about today's clinic visit or your schedule please contact [facility] directly at [phone].  Normal or non-critical lab and imaging results will be communicated to you by MyChart, letter or phone within 4 business days after the clinic has received the results. If you do not hear from us within 7 days, please contact the clinic through MyChart or phone. If you have a critical or abnormal lab result, we will notify you by phone as soon as possible.  Submit refill requests through [employer] or call your pharmacy and they will forward the refill request to us. Please allow 3 business days for your refill to be completed.          Additional Information About Your Visit        Care EveryWhere ID     This is your Care EveryWhere ID. This could be used by other organizations to access your [facility] medical records  [id number]        Your Vitals Were     Pulse Temperature Respirations Height Pulse Oximetry BMI (Body Mass Index)    72 98.6  F (37  C) (Oral) 12 6' 4\" (1.93 m) 98% 28.48 kg/m2       Blood Pressure from Last 3 Encounters:   09/27/18 108/62   03/28/18 120/70   08/05/16 117/80    Weight from Last 3 Encounters:   09/27/18 234 lb (106.1 kg)   03/28/18 " 240 lb (108.9 kg)              Today, you had the following     No orders found for display       Primary Care Provider Office Phone # Fax #    Venita TUTTLE MD Muriel [phone]       [address] [facility] [address]        Equal Access to Services     [facility]: Hadii aad ku hadasho Soomaali, waaxda luqadaha, qaybta kaalmada adeegyada, waxay idiin hayaan adetangela kharabela lakellyn . So [facility] [phone].    ATENCIÓN: Si habla español, tiene a marie disposición servicios gratuitos de asistencia lingüística. Llame al [phone].    We comply with applicable federal civil rights laws and [place] laws. We do not discriminate on the basis of race, color, national origin, age, disability, sex, sexual orientation, or gender identity.            Thank you!     Thank you for choosing [facility]  for your care. Our goal is always to provide you with excellent care. Hearing back from our patients is one way we can continue to improve our services. Please take a few minutes to complete the written survey that you may receive in the mail after your visit with us. Thank you!             Your Updated Medication List - Protect others around you: Learn how to safely use, store and throw away your medicines at www.disposemymeds.org.      Notice  As of 9/27/2018  1:34 PM    You have not been prescribed any medications.

## 2019-01-30 ENCOUNTER — OFFICE VISIT (OUTPATIENT)
Dept: FAMILY MEDICINE | Facility: CLINIC | Age: 34
End: 2019-01-30
Payer: COMMERCIAL

## 2019-01-30 VITALS — OXYGEN SATURATION: 99 % | HEART RATE: 59 BPM | SYSTOLIC BLOOD PRESSURE: 120 MMHG | DIASTOLIC BLOOD PRESSURE: 76 MMHG

## 2019-01-30 DIAGNOSIS — L70.0 ACNE VULGARIS: ICD-10-CM

## 2019-01-30 PROCEDURE — 99214 OFFICE O/P EST MOD 30 MIN: CPT | Performed by: FAMILY MEDICINE

## 2019-01-30 RX ORDER — CLINDAMYCIN PHOSPHATE 10 UG/ML
LOTION TOPICAL 2 TIMES DAILY
Qty: 60 ML | Refills: 3 | Status: SHIPPED | OUTPATIENT
Start: 2019-01-30 | End: 2019-07-18

## 2019-01-30 RX ORDER — ADAPALENE 0.1 G/100G
CREAM TOPICAL
Qty: 45 G | Refills: 3 | Status: SHIPPED | OUTPATIENT
Start: 2019-01-30 | End: 2020-01-30

## 2019-01-30 RX ORDER — MINOCYCLINE HYDROCHLORIDE 100 MG/1
100 CAPSULE ORAL 2 TIMES DAILY
Qty: 60 CAPSULE | Refills: 2 | Status: SHIPPED | OUTPATIENT
Start: 2019-01-30 | End: 2019-07-18

## 2019-01-30 NOTE — PROGRESS NOTES
"[facility] - PRIMARY CARE SKIN    CC: Acne  SUBJECTIVE:   Ilan Douglas is a(n) 34 year old male who presents to clinic today because of acne.Acne since one year.     Symptoms have been ongoing for: one year  The acne is primarily located on the: face  Acne generally presents as: inflammatory papules , closed comedones, leaves residual PIH    Current treatment: no prescription acne medication      Previous treatments include: none    Skin type: VI  Family history of acne: brother      Refer to electronic medical record (EMR) for past medical history and medications.      ROS: 14 point review of systems was negative except the symptoms listed above in the HPI.        OBJECTIVE:   GENERAL: healthy, alert and no distress.  SKIN: Culp Skin Type - VI.  Face, Neck and Trunk examined. The dermatoscope was used to help evaluate pigmented lesions.  Skin Pertinent Findings:  Face: inflammatory papules on the side of the face.  Chest: clear.  Back: clear.    Diagnostic Test Results:  none     ASSESSMENT:     Encounter Diagnosis   Name Primary?     Acne vulgaris      MDM: . Discussed pathophysiology of acne, treatment options, and expectations for treatment response.    PLAN:   Patient Instructions   FUTURE APPOINTMENTS  Follow up in 8 week(s)    ACNE TREATMENT PLAN    Minocycline 100 mg one tab two times per day    Morning or Evening (whenever you shower) :    Cetaphil facial cleanser.    Do a \"20/10\" wash (20 seconds of skin contact with the cleanser followed by a 10 second rinse)    Apply to face  (FYI Note - Benzoyl peroxide washes can bleach clothing, so try to use disposable or old towels and clothes.)    After cleansing, medication : apply topical Clindamycin 1% lotion  to face..    Evening or Morning (other time of day) :    Cetaphil facial cleanser - Do a \"20 / 10 wash\" again.        Bedtime :    Lastly, before going to bed, apply topical Adapalene 0.1 % cream.    Wait until face is dry after cleansing " before application.    Use just a pea-sized amount for application.    Recommendations if skin becomes too dry in response to medication:    Use Cetaphil facial moisturizer.    Alternate application of Adapalene 0.1% cream every other night for 2 weeks, to condition the skin. After 2 weeks, retry nightly application.    Decrease use of Replenix or other benzoyl peroxide wash to once per day. If still too irritating, decrease benzoyl peroxide product to 5%.    Recommended brands include: PanOxyl 4% creamy wash, Clean & Clear Advantage oil absorbing 5.5% cleanser, Neutrogena Clear Pore 3.7% daily scrub, [employer] 4% creamy face wash. Topix Benzaderm (changing name to Replenix) 5% wash can be found on amazon.com.          TT: 25 minutes.  CT:  20 minutes.

## 2019-01-30 NOTE — LETTER
"    1/30/2019         [facility]: [facility] Douglas  [address] [facility] [address]        Dear Colleague,    [facility] Please see a copy of my visit note below.    [facility] - PRIMARY CARE SKIN    CC: Acne  SUBJECTIVE:   Ilan Douglas is a(n) 34 year old male who presents to clinic today because of acne.Acne since one year.     Symptoms have been ongoing for: one year  The acne is primarily located on the: face  Acne generally presents as: inflammatory papules , closed comedones, leaves residual PIH    Current treatment: no prescription acne medication      Previous treatments include: none    Skin type: VI  Family history of acne: brother      Refer to electronic medical record (EMR) for past medical history and medications.      ROS: 14 point review of systems was negative except the symptoms listed above in the HPI.        OBJECTIVE:   GENERAL: healthy, alert and no distress.  SKIN: Culp Skin Type - VI.  Face, Neck and Trunk examined. The dermatoscope was used to help evaluate pigmented lesions.  Skin Pertinent Findings:  Face: inflammatory papules on the side of the face.  Chest: clear.  Back: clear.    Diagnostic Test Results:  none     ASSESSMENT:     Encounter Diagnosis   Name Primary?     Acne vulgaris      MDM: . Discussed pathophysiology of acne, treatment options, and expectations for treatment response.    PLAN:   Patient Instructions   FUTURE APPOINTMENTS  Follow up in 8 week(s)    ACNE TREATMENT PLAN    Minocycline 100 mg one tab two times per day    Morning or Evening (whenever you shower) :    Cetaphil facial cleanser.    Do a \"20/10\" wash (20 seconds of skin contact with the cleanser followed by a 10 second rinse)    Apply to face  (FYI Note - Benzoyl peroxide washes can bleach clothing, so try to use disposable or old towels and clothes.)    After cleansing, medication : apply topical Clindamycin 1% " "lotion  to face..    Evening or Morning (other time of day) :    Cetaphil facial cleanser - Do a \"20 / 10 wash\" again.        Bedtime :    Lastly, before going to bed, apply topical Adapalene 0.1 % cream.    Wait until face is dry after cleansing before application.    Use just a pea-sized amount for application.    Recommendations if skin becomes too dry in response to medication:    Use Cetaphil facial moisturizer.    Alternate application of Adapalene 0.1% cream every other night for 2 weeks, to condition the skin. After 2 weeks, retry nightly application.    Decrease use of Replenix or other benzoyl peroxide wash to once per day. If still too irritating, decrease benzoyl peroxide product to 5%.    Recommended brands include: PanOxyl 4% creamy wash, Clean & Clear Advantage oil absorbing 5.5% cleanser, Neutrogena Clear Pore 3.7% daily scrub, [employer]S 4% creamy face wash. Topix Benzaderm (changing name to Replenix) 5% wash can be found on amazon.com.          TT: 25 minutes.  CT:  20 minutes.          Again, thank you for allowing me to participate in the care of your patient.        Sincerely,        Luanne Morgan MD    "

## 2019-01-30 NOTE — PATIENT INSTRUCTIONS
"FUTURE APPOINTMENTS  Follow up in 8 week(s)    ACNE TREATMENT PLAN    Minocycline 100 mg one tab two times per day    Morning or Evening (whenever you shower) :    Cetaphil facial cleanser.    Do a \"20/10\" wash (20 seconds of skin contact with the cleanser followed by a 10 second rinse)    Apply to face  (FYI Note - Benzoyl peroxide washes can bleach clothing, so try to use disposable or old towels and clothes.)    After cleansing, medication : apply topical Clindamycin 1% lotion  to face..    Evening or Morning (other time of day) :    Cetaphil facial cleanser - Do a \"20 / 10 wash\" again.        Bedtime :    Lastly, before going to bed, apply topical Adapalene 0.1 % cream.    Wait until face is dry after cleansing before application.    Use just a pea-sized amount for application.    Recommendations if skin becomes too dry in response to medication:    Use Cetaphil facial moisturizer.    Alternate application of Adapalene 0.1% cream every other night for 2 weeks, to condition the skin. After 2 weeks, retry nightly application.    Decrease use of Replenix or other benzoyl peroxide wash to once per day. If still too irritating, decrease benzoyl peroxide product to 5%.    Recommended brands include: PanOxyl 4% creamy wash, Clean & Clear Advantage oil absorbing 5.5% cleanser, Neutrogena Clear Pore 3.7% daily scrub, CVS 4% creamy face wash. Topix Benzaderm (changing name to Replenix) 5% wash can be found on amazon.com.      "

## 2019-02-05 ENCOUNTER — TELEPHONE (OUTPATIENT)
Dept: FAMILY MEDICINE | Facility: CLINIC | Age: 34
End: 2019-02-05

## 2019-02-05 DIAGNOSIS — L70.0 ACNE VULGARIS: Primary | ICD-10-CM

## 2019-02-05 NOTE — TELEPHONE ENCOUNTER
Called number listed- busy  Called again- left voice mail    Bozena Mancilla,RN BSN  [facility]  [phone]

## 2019-02-05 NOTE — TELEPHONE ENCOUNTER
Please call and let him know that I will fax it over but tazorac may also not be covered by his insurance.     Tazorac is much stronger and may be more irritating.

## 2019-02-05 NOTE — TELEPHONE ENCOUNTER
Reason for Call:  Other returning call    Detailed comments: Pt called this afternoon returning a phone call for the skin clinic nurse. Please give pt a call back when you can. Thank you.    Phone Number Patient can be reached at: Home number on file [phone] (home)    Best Time:     Can we leave a detailed message on this number? YES    Call taken on 2/5/2019 at 12:44 PM by Anita Fisher

## 2019-02-05 NOTE — TELEPHONE ENCOUNTER
Patient notified of  providers message, patient has no further questions.    Bozena WILKESRN BSN  [facility]  [phone]

## 2019-02-05 NOTE — TELEPHONE ENCOUNTER
Pharmacy sent a fax:  insurance does not cover adapalene- [facility]    Bozena MancillaRN BSN  [facility]  [phone]

## 2019-02-07 NOTE — TELEPHONE ENCOUNTER
He can buy adaplene ( differin 1 % gel OTC ) gel over the counter.   It is reasonably priced.     Thank you,   Luanne Morgan M.D.

## 2019-02-07 NOTE — TELEPHONE ENCOUNTER
Left message on answering machine for patient to call back.    Bozena Mancilla,RN BSN  [facility]  [phone]

## 2019-02-07 NOTE — TELEPHONE ENCOUNTER
Received a fax from pharmacy- [facility] is not covered as well- any other suggestions?    Bozena MancillaRN BSN  [facility]  [phone]

## 2019-05-30 ENCOUNTER — HOSPITAL ENCOUNTER (EMERGENCY)
Facility: CLINIC | Age: 34
Discharge: HOME | End: 2019-05-30
Attending: EMERGENCY MEDICINE | Admitting: EMERGENCY MEDICINE
Payer: COMMERCIAL

## 2019-05-30 VITALS
SYSTOLIC BLOOD PRESSURE: 135 MMHG | TEMPERATURE: 98.7 F | BODY MASS INDEX: 26.79 KG/M2 | RESPIRATION RATE: 20 BRPM | DIASTOLIC BLOOD PRESSURE: 84 MMHG | HEIGHT: 76 IN | WEIGHT: 220 LBS | HEART RATE: 89 BPM

## 2019-05-30 DIAGNOSIS — S01.81XA LACERATION OF FOREHEAD, INITIAL ENCOUNTER: ICD-10-CM

## 2019-05-30 PROCEDURE — 99283 EMERGENCY DEPT VISIT LOW MDM: CPT

## 2019-05-30 PROCEDURE — 12011 RPR F/E/E/N/L/M 2.5 CM/<: CPT

## 2019-05-30 RX ORDER — LIDOCAINE HYDROCHLORIDE AND EPINEPHRINE 10; 10 MG/ML; UG/ML
INJECTION, SOLUTION INFILTRATION; PERINEURAL
Status: DISCONTINUED
Start: 2019-05-30 | End: 2019-05-30 | Stop reason: HOSPADM

## 2019-05-30 ASSESSMENT — ENCOUNTER SYMPTOMS: WOUND: 1

## 2019-05-30 ASSESSMENT — MIFFLIN-ST. JEOR: SCORE: 2039.41

## 2019-05-30 NOTE — ED TRIAGE NOTES
Pt to ER with c/o lacx to left side of forehead pt was struck in face with elbow while playing basketball

## 2019-05-30 NOTE — ED AVS SNAPSHOT
[facility] Emergency Department  [address]  [facility] [address]  Phone:  [phone]  Fax:  [phone]                                    Ilan Douglas   MRN: [id number]    Department:  [facility] Emergency Department   Date of Visit:  5/30/2019           After Visit Summary Signature Page    I have received my discharge instructions, and my questions have been answered. I have discussed any challenges I see with this plan with the nurse or doctor.    ..........................................................................................................................................  Patient/Patient Representative Signature      ..........................................................................................................................................  Patient Representative Print Name and Relationship to Patient    ..................................................               ................................................  Date                                   Time    ..........................................................................................................................................  Reviewed by Signature/Title    ...................................................              ..............................................  Date                                               Time          22EPIC Rev 08/18

## 2019-05-30 NOTE — ED PROVIDER NOTES
"  History     Chief Complaint:  Facial laceration     HPI   Ilan Douglas is a 34 year old male who presents with facial laceration. The patient reports that tonight he was playing basketball when he was struck in the face resulting in a laceration to his left forehead near his eyebrow. He denies any other injuries or loss of consciousness. The patient states that this tetanus was within in the past 5 years.     Allergies:  No Known Allergies     Medications:    Adapalene  Clindamycin  Minocycline  Tazorac     Past Medical History:    Acne    Past Surgical History:    History reviewed. No pertinent surgical history.    Family History:    Unknown/adopted    Social History:  The patient was accompanied to the ED by himself.  Smoking Status: Never Smoker  Smokeless Tobacco: Never Used  Drug use: Negative  Alcohol Use: Negative   Marital Status:       Review of Systems   Constitutional:        No other injuries   Skin: Positive for wound.   Neurological: Negative for syncope.   All other systems reviewed and are negative.    Physical Exam     Patient Vitals for the past 24 hrs:   BP Temp Temp src Pulse Resp Height Weight   05/30/19 0109 135/84 98.7  F (37.1  C) Temporal 89 20 1.93 m (6' 4\") 99.8 kg (220 lb)      Physical Exam    Nursing note and vitals reviewed.  Constitutional: Cooperative.   Eyes: Pupils are equal, round, and reactive to light. EOMI  Pulmonary/Chest: Effort normal.   Neurological: Alert. GCS 15.  Oriented x4  Skin: Skin is warm and dry. No rash noted. Laceration superior to the left eyebrow.  Psychiatric: Normal mood and affect.     Emergency Department Course     Procedures:      Narrative: Procedure: Laceration Repair        LACERATION:  A simple clean 2.2 cm laceration.      LOCATION:  Superior to left eyebrow      ANESTHESIA:  Local using Lidocaine 1% with epinephrine.      PREPARATION:  Irrigation and Scrubbing with Normal Saline      DEBRIDEMENT:  no debridement and wound explored, no " foreign body found      CLOSURE:  Wound was closed with One Layer.  Skin closed with 5 x 5.0 gut using interrupted sutures.     Emergency Department Course:     Nursing notes and vitals reviewed.    0356 I performed an exam of the patient as documented above.     0434 Procedure started.     0440 I personally reviewed the exam results with the patient and answered all related questions prior to discharge.    Impression & Plan      Medical Decision Making:  Ilan Douglas is a 34 year old male who presents to the emergency department today for evaluation of an uncomplicated laceration of left forehead. The wound was carefully evaluated and explored. Was repaired as noted above. There is no evidence at this time of bony injury, foreign body, or deep space infection.  No indication for advanced imaging. Follow up in 2-3 days time if concerns over healing. Possible complications (infection, scarring) were reviewed with the patient. Dissolvable sutures. Indications for immediate return to ED were reviewed and included but are not limited to, redness, fevers, drainage, increasing pain, high fevers, or other concerns.     Diagnosis:    ICD-10-CM    1. Laceration of forehead, initial encounter S01.81XA      Disposition:   The patient is discharged to home.     Discharge Medications:    No discharge medications.    Scribe Disclosure:  I, Orla Severson, am serving as a scribe at 3:57 AM on 5/30/2019 to document services personally performed by Kenneth Valdez MD based on my observations and the provider's statements to me.     [facility] EMERGENCY DEPARTMENT       Kenneth Valdez MD  05/30/19 1430

## 2019-06-05 ENCOUNTER — ALLIED HEALTH/NURSE VISIT (OUTPATIENT)
Dept: NURSING | Facility: CLINIC | Age: 34
End: 2019-06-05
Payer: COMMERCIAL

## 2019-06-05 DIAGNOSIS — Z48.02 VISIT FOR SUTURE REMOVAL: Primary | ICD-10-CM

## 2019-06-05 NOTE — PROGRESS NOTES
Josedontrell Douglas presents to the clinic today for removal of sutures.  The patient has had the sutures and sutures in place for 7 days.  There has been no history of infection or drainage.  4 sutures are seen located on the forehead. Patient stated one suture fell out.  The wound is healing well with no signs of infection.  Tetanus status is up to date.   All sutures were easily removed today.  Routine wound care discussed.  The patient will follow up as needed.

## 2019-07-18 ENCOUNTER — OFFICE VISIT (OUTPATIENT)
Dept: INTERNAL MEDICINE | Facility: CLINIC | Age: 34
End: 2019-07-18
Payer: COMMERCIAL

## 2019-07-18 VITALS
OXYGEN SATURATION: 97 % | TEMPERATURE: 98.2 F | DIASTOLIC BLOOD PRESSURE: 64 MMHG | WEIGHT: 227 LBS | SYSTOLIC BLOOD PRESSURE: 102 MMHG | BODY MASS INDEX: 27.63 KG/M2 | HEART RATE: 51 BPM | RESPIRATION RATE: 16 BRPM

## 2019-07-18 DIAGNOSIS — Z86.69: ICD-10-CM

## 2019-07-18 DIAGNOSIS — H92.03 OTALGIA, BILATERAL: Primary | ICD-10-CM

## 2019-07-18 DIAGNOSIS — M54.50 CHRONIC MIDLINE LOW BACK PAIN WITHOUT SCIATICA: ICD-10-CM

## 2019-07-18 DIAGNOSIS — G89.29 CHRONIC MIDLINE LOW BACK PAIN WITHOUT SCIATICA: ICD-10-CM

## 2019-07-18 PROCEDURE — 99214 OFFICE O/P EST MOD 30 MIN: CPT | Performed by: INTERNAL MEDICINE

## 2019-07-18 NOTE — PATIENT INSTRUCTIONS
Try using a decongestant such as pseudoephedrine     [facility]- physical therapy and chiropractic providers

## 2019-07-18 NOTE — PROGRESS NOTES
Subjective     Ilan Douglas is a 34 year old male who presents to clinic today for the following health issues:    HPI   Ear pain      Duration: 2 weeks    Description (location/character/radiation): both ears    Intensity:  moderate    Accompanying signs and symptoms: recent cold, hearing is muffled    History (similar episodes/previous evaluation): None    Precipitating or alleviating factors: None    Therapies tried and outcome: uses debrox     Sneezing, sinus congestion.     LBP  - mild ongoing mid back and lower back pain. No radiation. Pain mostly noted w/bending. Some stiffness in the morning.   Used to see a chiropractor but no longer.     Reviewed and updated as needed this visit by Provider         Review of Systems   ROS COMP: Constitutional, HEENT, cardiovascular, pulmonary, gi and gu systems are negative, except as otherwise noted.      Objective    /64   Pulse 51   Temp 98.2  F (36.8  C) (Oral)   Resp 16   Wt 103 kg (227 lb)   SpO2 97%   BMI 27.63 kg/m    Body mass index is 27.63 kg/m .  Physical Exam   GENERAL APPEARANCE: healthy, tall, alert and no distress  HENT: both TMs with scarring, old healed perforations. No cerumen noted in canal bilat.   Comprehensive back pain exam:  No tenderness, Range of motion not limited by pain, Lower extremity strength functional and equal on both sides, Lower extremity reflexes within normal limits bilaterally and Lower extremity sensation normal and equal on both sides    Diagnostic Test Results:  Labs reviewed in Epic        Assessment & Plan     1. Otalgia, bilateral  2. Healed perforation of tympanic membrane of both ears  Possibly some pressure from sinuns/rhinitis. Nothing noted in outer/middle ear.  - see pt instructions    3. Chronic midline low back pain without sciatica  Normal exam. Discussed avoiding repeated bending too much at work . Very tall so needs to adjust for proper ergonomics. otherwise could see FIONA if wishes by self referral or by  "contacting us       BMI:   Estimated body mass index is 27.63 kg/m  as calculated from the following:    Height as of 5/30/19: 1.93 m (6' 4\").    Weight as of this encounter: 103 kg (227 lb).           Regular exercise    No follow-ups on file.    Mekhi Coleman MD  [facility]      "

## 2020-08-18 ENCOUNTER — TELEPHONE (OUTPATIENT)
Dept: ORTHOPEDICS | Facility: CLINIC | Age: 35
End: 2020-08-18

## 2020-08-18 ENCOUNTER — APPOINTMENT (OUTPATIENT)
Dept: INTERPRETER SERVICES | Facility: CLINIC | Age: 35
End: 2020-08-18

## 2020-08-18 NOTE — TELEPHONE ENCOUNTER
[facility] Call Center    Phone Message    May a detailed message be left on voicemail: yes     Reason for Call: Symptoms or Concerns     If patient has red-flag symptoms, warm transfer to triage line    Current symptom or concern: leg pain    Symptoms have been present for:  unknown day(s)    Has patient previously been seen for this? Yes    By : Dr. Pantoja    Date: 3/09/17    Are there any new or worsening symptoms? Yes: Pt called and stated he is having a lot of leg pain, he thinks something is wrong with the metal in his legs, would like to see Dr. Pantoja      Action Taken: Message routed to:  [facility] ortho

## 2020-08-18 NOTE — TELEPHONE ENCOUNTER
Suraj has history of proximal right femur osteotomy and multiple I&Ds following his surgery in 2015 and 2016.  Pt was called back by RN with use of Welsh .  He lives in [place], declined early appt next week but would rather wait until the following week on 8/31/20 to review his right leg pain with Dr Pantoja.  Appt has been made.  Adeline Pittman RN

## 2020-08-19 ENCOUNTER — APPOINTMENT (OUTPATIENT)
Dept: INTERPRETER SERVICES | Facility: CLINIC | Age: 35
End: 2020-08-19

## 2020-08-19 DIAGNOSIS — S72.061R: Primary | ICD-10-CM

## 2020-08-31 ENCOUNTER — ANCILLARY PROCEDURE (OUTPATIENT)
Dept: GENERAL RADIOLOGY | Facility: CLINIC | Age: 35
End: 2020-08-31
Attending: ORTHOPAEDIC SURGERY
Payer: COMMERCIAL

## 2020-08-31 ENCOUNTER — OFFICE VISIT (OUTPATIENT)
Dept: ORTHOPEDICS | Facility: CLINIC | Age: 35
End: 2020-08-31
Payer: COMMERCIAL

## 2020-08-31 VITALS — HEIGHT: 67 IN | WEIGHT: 175 LBS | BODY MASS INDEX: 27.47 KG/M2

## 2020-08-31 DIAGNOSIS — S72.061R: ICD-10-CM

## 2020-08-31 DIAGNOSIS — S72.061R: Primary | ICD-10-CM

## 2020-08-31 ASSESSMENT — MIFFLIN-ST. JEOR: SCORE: 1680.03

## 2020-08-31 NOTE — LETTER
8/31/2020         RE: Suraj Dobbins  [address]  [place] [address]        Dear Colleague,    Thank you for referring your patient, Suraj Dobbins, to the [facility]. Please see a copy of my visit note below.    Service Date: 08/31/2020      Suraj is seen in followup in regards to complaints of diffuse bilateral upper extremity and lower extremity numbness.  He states that his symptoms have been for the past 2 years with no known inciting event.  He states he has been evaluated by his family practice physician as well as a neurologist and a psychologist.  He states that the numbness is episodic but essentially daily.  He notes no localizing discomfort in the right lower extremity or the left lower extremity.      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  He reports no localizing symptoms to the right hip, thigh or leg.      PHYSICAL EXAMINATION:  The right lower extremity is 10 cm short clinically with the foot well positioned in neutral and neurocirculatory status is intact.  His knee motion is from 0-70 degrees.  His incisions have healed without signs of infection.  He has what appears to be normal strength in his foot and ankle.        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  There are no untoward changes in the double plating evident.  He has severe degenerative changes in his right knee on x-ray.  The previous discrepancy of 16 cm appears to be improved with the repositioning osteotomy now at 10 cm.      ASSESSMENT:  Healed right hip repositioning osteotomy, arthrodesis.  Diffuse upper and lower extremities hypesthesia from no  obvious orthopedic cause.  I have reassured him that this is not from the orthopedic hardware and that he can return to see his primary care physician as necessary.  There is no associated orthopedic cause of his symptoms.      cc:   Family care physician         SANTANA VALENZUELA MD             D: 2020   T: 2020   MT: symone      Name:     CHANDRIKA RETANA   MRN:      [id number]        Account:      [id number]   :      1985           Service Date: 2020      Document: [id number]

## 2020-08-31 NOTE — PROGRESS NOTES
Service Date: 08/31/2020      Suraj is seen in followup in regards to complaints of diffuse bilateral upper extremity and lower extremity numbness.  He states that his symptoms have been for the past 2 years with no known inciting event.  He states he has been evaluated by his family practice physician as well as a neurologist and a psychologist.  He states that the numbness is episodic but essentially daily.  He notes no localizing discomfort in the right lower extremity or the left lower extremity.      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  He reports no localizing symptoms to the right hip, thigh or leg.      PHYSICAL EXAMINATION:  The right lower extremity is 10 cm short clinically with the foot well positioned in neutral and neurocirculatory status is intact.  His knee motion is from 0-70 degrees.  His incisions have healed without signs of infection.  He has what appears to be normal strength in his foot and ankle.        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  There are no untoward changes in the double plating evident.  He has severe degenerative changes in his right knee on x-ray.  The previous discrepancy of 16 cm appears to be improved with the repositioning osteotomy now at 10 cm.      ASSESSMENT:  Healed right hip repositioning osteotomy, arthrodesis.  Diffuse upper and lower extremities hypesthesia from no obvious orthopedic cause.  I have reassured him that this is not from the orthopedic hardware and that he can return to see his primary care physician as necessary.  There is no associated orthopedic cause of his symptoms.      cc:   Family care physician          SANTANA VALENZUELA MD             D: 2020   T: 2020   MT: symone      Name:     CHANDRIKA RETANA   MRN:      [id number]        Account:      [id number]   :      1985           Service Date: 2020      Document: [id number]

## 2020-08-31 NOTE — NURSING NOTE
"Reason For Visit:   Chief Complaint   Patient presents with     RECHECK     followup right leg pain // hx of osteotomy 2015        Ht 1.69 m (5' 6.54\")   Wt 79.4 kg (175 lb)   BMI 27.79 kg/m      Pain Assessment  Patient Currently in Pain: Yes  0-10 Pain Scale: 3  Primary Pain Location: (right leg and right flank)    Anabel Saini ATC    "

## 2021-02-15 ENCOUNTER — OFFICE VISIT (OUTPATIENT)
Dept: INTERNAL MEDICINE | Facility: CLINIC | Age: 36
End: 2021-02-15
Payer: COMMERCIAL

## 2021-02-15 VITALS
DIASTOLIC BLOOD PRESSURE: 71 MMHG | SYSTOLIC BLOOD PRESSURE: 116 MMHG | HEIGHT: 76 IN | TEMPERATURE: 98.9 F | BODY MASS INDEX: 29.96 KG/M2 | HEART RATE: 74 BPM | OXYGEN SATURATION: 98 % | WEIGHT: 246 LBS

## 2021-02-15 DIAGNOSIS — H60.393 INFECTIVE OTITIS EXTERNA, BILATERAL: Primary | ICD-10-CM

## 2021-02-15 PROCEDURE — 99213 OFFICE O/P EST LOW 20 MIN: CPT | Performed by: INTERNAL MEDICINE

## 2021-02-15 RX ORDER — ACETIC ACID 20.65 MG/ML
2 SOLUTION AURICULAR (OTIC) 2 TIMES DAILY
Qty: 15 ML | Refills: 0 | Status: SHIPPED | OUTPATIENT
Start: 2021-02-15 | End: 2021-02-22

## 2021-02-15 ASSESSMENT — MIFFLIN-ST. JEOR: SCORE: 2147.35

## 2021-02-15 NOTE — PROGRESS NOTES
"    Assessment & Plan     Infective otitis externa, bilateral  Acetic acid treatment for 1 week, then reassess , may need hearing test   - acetic acid (VOSOL) 2 % otic solution; Place 2 drops into both ears 2 times daily for 7 days     BMI:   Estimated body mass index is 29.94 kg/m  as calculated from the following:    Height as of this encounter: 1.93 m (6' 4\").    Weight as of this encounter: 111.6 kg (246 lb).       See Patient Instructions    No follow-ups on file.    Triston Mulligan MD  [facility] GIUSEPPE Talavera is a 36 year old who presents for the following health issues     HPI   Chief Complaint   Patient presents with     Ear Problem     Bilateral ear discomfort/pain off and on, ears plugged         Presents with  feeling pain in the ears , plugged feeling on and off for a year.   No ringing. No hearing loss. No work with noise.   His wife noted that he is talking louder, possible decreased hearing.         Review of Systems   Constitutional, HEENT, cardiovascular, pulmonary, gi and gu systems are negative, except as otherwise noted.      Objective    /71 (BP Location: Left arm, Patient Position: Sitting, Cuff Size: Adult Large)   Pulse 74   Temp 98.9  F (37.2  C) (Oral)   Ht 1.93 m (6' 4\")   Wt 111.6 kg (246 lb)   SpO2 98%   BMI 29.94 kg/m    Body mass index is 29.94 kg/m .  Physical Exam   GENERAL: healthy, alert and no distress  HENT: ear canals with scarce amount of cerumen , TMs with whitish exudate, nose and mouth without ulcers or lesions  MS: no gross musculoskeletal defects noted, no edema                "

## 2021-05-20 ENCOUNTER — OFFICE VISIT (OUTPATIENT)
Dept: INTERNAL MEDICINE | Facility: CLINIC | Age: 36
End: 2021-05-20
Payer: COMMERCIAL

## 2021-05-20 VITALS
BODY MASS INDEX: 29.38 KG/M2 | HEIGHT: 76 IN | SYSTOLIC BLOOD PRESSURE: 118 MMHG | RESPIRATION RATE: 16 BRPM | DIASTOLIC BLOOD PRESSURE: 66 MMHG | OXYGEN SATURATION: 99 % | HEART RATE: 66 BPM | TEMPERATURE: 98.6 F | WEIGHT: 241.3 LBS

## 2021-05-20 DIAGNOSIS — H69.90 DYSFUNCTION OF EUSTACHIAN TUBE, UNSPECIFIED LATERALITY: Primary | ICD-10-CM

## 2021-05-20 PROCEDURE — 99213 OFFICE O/P EST LOW 20 MIN: CPT | Performed by: NURSE PRACTITIONER

## 2021-05-20 ASSESSMENT — PAIN SCALES - GENERAL: PAINLEVEL: NO PAIN (0)

## 2021-05-20 ASSESSMENT — MIFFLIN-ST. JEOR: SCORE: 2126.03

## 2021-05-20 NOTE — PROGRESS NOTES
"    Assessment & Plan     Dysfunction of Eustachian tube, unspecified laterality        OTC allegra D, Flonase  F/u prn         BMI:   Estimated body mass index is 29.37 kg/m  as calculated from the following:    Height as of this encounter: 1.93 m (6' 4\").    Weight as of this encounter: 109.5 kg (241 lb 4.8 oz).           No follow-ups on file.    Patty Day NP  [facility] GIUSEPPE Talavera is a 36 year old who presents for the following health issues     HPI     Concern - R ear pain  Onset: 4-5 days  Description: R ear fullness, decreased hearing, fluid  Intensity: mild  Progression of Symptoms:  same  Accompanying Signs & Symptoms: afebrile, no URI sx.  Previous history of similar problem: seasonal allergies  Precipitating factors:        Worsened by: none  Alleviating factors:        Improved by: none  Therapies tried and outcome:  none         Review of Systems   Constitutional, HEENT, cardiovascular, pulmonary, gi and gu systems are negative, except as otherwise noted.      Objective    /66   Pulse 66   Temp 98.6  F (37  C) (Oral)   Resp 16   Ht 1.93 m (6' 4\")   Wt 109.5 kg (241 lb 4.8 oz)   SpO2 99%   BMI 29.37 kg/m    Body mass index is 29.37 kg/m .  Physical Exam   GENERAL: healthy, alert and no distress  EYES: Eyes grossly normal to inspection, PERRL and conjunctivae and sclerae normal  HENT: ear canals and TM's normal                "

## 2021-05-20 NOTE — NURSING NOTE
"Rt ear pain.  Vital signs:  Temp: 98.6  F (37  C) Temp src: Oral BP: 118/66 Pulse: 66   Resp: 16 SpO2: 99 %     Height: 193 cm (6' 4\") Weight: 109.5 kg (241 lb 4.8 oz)  Estimated body mass index is 29.37 kg/m  as calculated from the following:    Height as of this encounter: 1.93 m (6' 4\").    Weight as of this encounter: 109.5 kg (241 lb 4.8 oz).          "

## 2021-08-30 ENCOUNTER — OFFICE VISIT (OUTPATIENT)
Dept: INTERNAL MEDICINE | Facility: CLINIC | Age: 36
End: 2021-08-30
Payer: COMMERCIAL

## 2021-08-30 VITALS
OXYGEN SATURATION: 98 % | RESPIRATION RATE: 16 BRPM | DIASTOLIC BLOOD PRESSURE: 58 MMHG | SYSTOLIC BLOOD PRESSURE: 110 MMHG | TEMPERATURE: 98.5 F | WEIGHT: 222.1 LBS | HEART RATE: 75 BPM | BODY MASS INDEX: 27.05 KG/M2 | HEIGHT: 76 IN

## 2021-08-30 DIAGNOSIS — H66.014 RECURRENT ACUTE SUPPURATIVE OTITIS MEDIA OF RIGHT EAR WITH SPONTANEOUS RUPTURE OF TYMPANIC MEMBRANE: Primary | ICD-10-CM

## 2021-08-30 DIAGNOSIS — H72.91 PERFORATED EARDRUM, RIGHT: ICD-10-CM

## 2021-08-30 PROCEDURE — 99214 OFFICE O/P EST MOD 30 MIN: CPT | Performed by: NURSE PRACTITIONER

## 2021-08-30 RX ORDER — FLUTICASONE PROPIONATE 50 MCG
1 SPRAY, SUSPENSION (ML) NASAL DAILY
Qty: 11.1 ML | Refills: 1 | Status: SHIPPED | OUTPATIENT
Start: 2021-08-30

## 2021-08-30 RX ORDER — AZITHROMYCIN 250 MG/1
TABLET, FILM COATED ORAL
Qty: 6 TABLET | Refills: 0 | Status: SHIPPED | OUTPATIENT
Start: 2021-08-30 | End: 2021-09-04

## 2021-08-30 ASSESSMENT — MIFFLIN-ST. JEOR: SCORE: 2038.94

## 2021-08-30 NOTE — PATIENT INSTRUCTIONS
Take antibiotic Azithromycin as directed until gone    Use the nasal spray called Fluticasone 1 spray twice daily x 1 week then daily in the evening x 1 week then as needed for sinus drainage into ears    Avoid holding the nose and blowing outward as you now have a perforated ear drum on the right side    Referral to ENT (Ears Nose Throat) specialist for further evaluation

## 2021-08-30 NOTE — PROGRESS NOTES
"    Assessment & Plan     Recurrent acute suppurative otitis media of right ear with spontaneous rupture of tympanic membrane  - will treat with antibiotic and nasal spray:  - fluticasone (FLONASE) 50 MCG/ACT nasal spray; Spray 1 spray into both nostrils daily  - azithromycin (ZITHROMAX) 250 MG tablet; Take 2 tablets (500 mg) by mouth daily for 1 day, THEN 1 tablet (250 mg) daily for 4 days.  - Otolaryngology Referral; Future; recurrent  - the ear may be painful to fly so Ibuprofen can help    Perforated eardrum, right  - Otolaryngology Referral; Future: make appointment for when you return  [id number]}     BMI:   Estimated body mass index is 27.03 kg/m  as calculated from the following:    Height as of this encounter: 1.93 m (6' 4\").    Weight as of this encounter: 100.7 kg (222 lb 1.6 oz).       Patient Instructions   Take antibiotic Azithromycin as directed until gone    Use the nasal spray called Fluticasone 1 spray twice daily x 1 week then daily in the evening x 1 week then as needed for sinus drainage into ears    Avoid holding the nose and blowing outward as you now have a perforated ear drum on the right side    Referral to ENT (Ears Nose Throat) specialist for further evaluation      Return in about 2 months (around 10/30/2021) for Routine preventive, in person with fasting labs.    Vivienne Espitia [facility] Tala[facility]a is a 36 year old who presents for the following health issues    HPI     Here for complaints of: right ear pain started over the weekend but have problems with left one as well.  Decreased hearing. No fever.  Has congestion and cold symptoms over a week ago; Covid test negative as he is flying out tomorrow to [place] and then [place] for about 10 days.  Has been putting in some ear wax drops removal to see if helps.  No fever or cough.  Stuffy nose with throat drainage.  No shortness of breathe or chest pain.    Per review of chart, was seen back on " "2/15/2021 for bilateral Otitis Externa and treated with ear drops.  Also seen back on 7/18/219 for ear pain.    Due for physical and labs.    Review of Systems   Constitutional, HEENT, cardiovascular, pulmonary, gi and gu systems are negative, except as otherwise noted.      Objective    /58 (BP Location: Right arm, Patient Position: Chair, Cuff Size: Adult Large)   Pulse 75   Temp 98.5  F (36.9  C) (Oral)   Resp 16   Ht 1.93 m (6' 4\")   Wt 100.7 kg (222 lb 1.6 oz)   SpO2 98%   BMI 27.03 kg/m    Body mass index is 27.03 kg/m .     Physical Exam   GENERAL: alert and no distress  HENT: left ear canal and TM's normal with right ear canal removed what appeared to be moist ear wax and was able to see TM erythema and what appears to be a perforated TM, nose pale and boddy and mouth without ulcers or lesions but has clear PND with slightly enlarged tonsils; right pre/post auricular tenderness  NECK: no adenopathy, no asymmetry, masses, or scars and thyroid normal to palpation  RESP: lungs clear to auscultation - no rales, rhonchi or wheezes  CV: regular rate and rhythm, normal S1 S2, no S3 or S4, no murmur, click or rub, no peripheral edema and peripheral pulses strong  SKIN: no suspicious lesions or rashes            "

## 2021-12-13 NOTE — TELEPHONE ENCOUNTER
Patient called back- notified of non coverage- advised of the Differin gel.  patient will discuss with pharmacist the cost of the [place].    Bozena MancillaRN BSN  [facility]  [phone]     Manual Repair Warning Statement: We plan on removing the manually selected variable below in favor of our much easier automatic structured text blocks found in the previous tab. We decided to do this to help make the flow better and give you the full power of structured data. Manual selection is never going to be ideal in our platform and I would encourage you to avoid using manual selection from this point on, especially since I will be sunsetting this feature. It is important that you do one of two things with the customized text below. First, you can save all of the text in a word file so you can have it for future reference. Second, transfer the text to the appropriate area in the Library tab. Lastly, if there is a flap or graft type which we do not have you need to let us know right away so I can add it in before the variable is hidden. No need to panic, we plan to give you roughly 6 months to make the change.

## 2023-05-26 ENCOUNTER — ANCILLARY PROCEDURE (OUTPATIENT)
Dept: GENERAL RADIOLOGY | Facility: CLINIC | Age: 38
End: 2023-05-26
Attending: STUDENT IN AN ORGANIZED HEALTH CARE EDUCATION/TRAINING PROGRAM
Payer: COMMERCIAL

## 2023-05-26 ENCOUNTER — OFFICE VISIT (OUTPATIENT)
Dept: FAMILY MEDICINE | Facility: CLINIC | Age: 38
End: 2023-05-26
Payer: COMMERCIAL

## 2023-05-26 VITALS
TEMPERATURE: 98.4 F | BODY MASS INDEX: 27.75 KG/M2 | OXYGEN SATURATION: 98 % | DIASTOLIC BLOOD PRESSURE: 67 MMHG | WEIGHT: 235 LBS | SYSTOLIC BLOOD PRESSURE: 117 MMHG | HEART RATE: 51 BPM | HEIGHT: 77 IN | RESPIRATION RATE: 16 BRPM

## 2023-05-26 DIAGNOSIS — R22.2 MASS ON BACK: ICD-10-CM

## 2023-05-26 DIAGNOSIS — M54.50 CHRONIC RIGHT-SIDED LOW BACK PAIN WITHOUT SCIATICA: ICD-10-CM

## 2023-05-26 DIAGNOSIS — G89.29 CHRONIC RIGHT-SIDED LOW BACK PAIN WITHOUT SCIATICA: ICD-10-CM

## 2023-05-26 DIAGNOSIS — G89.29 CHRONIC RIGHT-SIDED LOW BACK PAIN WITHOUT SCIATICA: Primary | ICD-10-CM

## 2023-05-26 DIAGNOSIS — M54.50 CHRONIC RIGHT-SIDED LOW BACK PAIN WITHOUT SCIATICA: Primary | ICD-10-CM

## 2023-05-26 PROCEDURE — 72100 X-RAY EXAM L-S SPINE 2/3 VWS: CPT | Mod: TC | Performed by: RADIOLOGY

## 2023-05-26 PROCEDURE — 99213 OFFICE O/P EST LOW 20 MIN: CPT | Performed by: STUDENT IN AN ORGANIZED HEALTH CARE EDUCATION/TRAINING PROGRAM

## 2023-05-26 RX ORDER — LIDOCAINE 4 G/G
1 PATCH TOPICAL EVERY 24 HOURS
Qty: 10 PATCH | Refills: 1 | Status: SHIPPED | OUTPATIENT
Start: 2023-05-26

## 2023-05-26 ASSESSMENT — PATIENT HEALTH QUESTIONNAIRE - PHQ9
SUM OF ALL RESPONSES TO PHQ QUESTIONS 1-9: 2
10. IF YOU CHECKED OFF ANY PROBLEMS, HOW DIFFICULT HAVE THESE PROBLEMS MADE IT FOR YOU TO DO YOUR WORK, TAKE CARE OF THINGS AT HOME, OR GET ALONG WITH OTHER PEOPLE: NOT DIFFICULT AT ALL
SUM OF ALL RESPONSES TO PHQ QUESTIONS 1-9: 2

## 2023-05-26 NOTE — PROGRESS NOTES
"  Assessment & Plan     Chronic right-sided low back pain without sciatica  Chronic, suspect musculoskeletal pain.  Considered SI joint patient had a mild tenderness to palpation of the right SI joint, considered ankylosing spondylitis, however seems less likely based on history and exam.  No red flag signs.  Will order x-ray, trial lidocaine and diclofenac in addition to high-dose Tylenol and ibuprofen.  Counseled on going to physical therapy, patient was agreeable.  - XR Lumbar Spine 2/3 Views  - Lidocaine (LIDOCARE) 4 % Patch  Dispense: 10 patch; Refill: 1  - diclofenac (VOLTAREN) 1 % topical gel  Dispense: 100 g; Refill: 1  - Physical Therapy Referral    Mass on back  Approximately 4 cm x 3 cm round, mobile mass nontender to palpation on the left side of the lumbar spine at approximately L3.  Suspect it may be a lipoma.  Will order ultrasound.  Patient reports that he believes he has had this for a long time, however unsure how long.  Does not seem to trigger the back pain.  - POC US SOFT TISSUE      Review of external notes as documented elsewhere in note       BMI:   Estimated body mass index is 28.03 kg/m  as calculated from the following:    Height as of this encounter: 1.95 m (6' 4.77\").    Weight as of this encounter: 106.6 kg (235 lb).       Calista Higgins MD  [facility]    Subjective   Ilan is a 38 year old, presenting for the following health issues:  office visit (Lower back pain.)        5/26/2023    12:41 PM   Additional Questions   Roomed by    Accompanied by self     History of Present Illness       Back Pain:  He presents for follow up of back pain. Patient's back pain is a recurring problem.  Location of back pain:  Right lower back, left lower back, left middle of back and right upper back  Description of back pain: sharp  Back pain spreads: right side of neck and left side of neck    Since patient first noticed back pain, pain is: gradually worsening  Does back pain " "interfere with his job:  Yes      He eats 2-3 servings of fruits and vegetables daily.He consumes 1 sweetened beverage(s) daily. He exercises with enough effort to increase his heart rate 3 or less days per week.   He is taking medications regularly.    Today's PHQ-9         PHQ-9 Total Score: 2    PHQ-9 Q9 Thoughts of better off dead/self-harm past 2 weeks :   Not at all    How difficult have these problems made it for you to do your work, take care of things at home, or get along with other people: Not difficult at all       - Months  - chiro - kind of helps, but can't do much.   - comes and go  - hurts more with activity  - hurts with bending  - sometimes in the morning, and setting up  - Denies red flags, including: Fever, history of IV drug use or malignancy, trauma or injury, loss of bowel or bladder control, saddle anesthesia, numbness, weakness, or tingling of lower extremities.  - ibuprofen helps      Review of Systems   Constitutional: Negative for fever and chills.   Respiratory: Negative for cough or shortness of breath.    Cardiovascular: Negative for chest pain or chest pressure.   Gastrointestinal: Negative for nausea, vomiting, diarrhea or abdominal pain.        Objective    /67 (BP Location: Left arm, Patient Position: Sitting, Cuff Size: Adult Regular)   Pulse 51   Temp 98.4  F (36.9  C) (Temporal)   Resp 16   Ht 1.95 m (6' 4.77\")   Wt 106.6 kg (235 lb)   SpO2 98%   BMI 28.03 kg/m    Body mass index is 28.03 kg/m .  Physical Exam   PHYSICAL EXAM  General: Well developed, well nourished.  Skin:  Dry without rash.  Approximately 4 cm x 3 cm round, mobile mass nontender to palpation on the left side of the lumbar spine at approximately L3.   Head:  Normocephalic-atraumatic.    Eye:  Normal conjunctivae.     Respiratory:  Normal respiratory effort.   Gastrointestinal:  Non-distended.    Musculoskeletal: No tenderness to palpation of the spine or paraspinal muscles.  Tenderness to palpation " of the right SI joint normal range of motion, sensation, strength.  No deformity or edema.  Negative straight leg raise test bilaterally.  Neurologic: No focal deficits.      Prior to immunization administration, verified patients identity using patient s name and date of birth. Please see Immunization Activity for additional information.     Screening Questionnaire for Adult Immunization    Are you sick today?   No   Do you have allergies to medications, food, a vaccine component or latex?   No   Have you ever had a serious reaction after receiving a vaccination?   No   Do you have a long-term health problem with heart, lung, kidney, or metabolic disease (e.g., diabetes), asthma, a blood disorder, no spleen, complement component deficiency, a cochlear implant, or a spinal fluid leak?  Are you on long-term aspirin therapy?   No   Do you have cancer, leukemia, HIV/AIDS, or any other immune system problem?   No   Do you have a parent, brother, or sister with an immune system problem?   No   In the past 3 months, have you taken medications that affect  your immune system, such as prednisone, other steroids, or anticancer drugs; drugs for the treatment of rheumatoid arthritis, Crohn s disease, or psoriasis; or have you had radiation treatments?   No   Have you had a seizure, or a brain or other nervous system problem?   No   During the past year, have you received a transfusion of blood or blood    products, or been given immune (gamma) globulin or antiviral drug?   No   For women: Are you pregnant or is there a chance you could become       pregnant during the next month?   No   Have you received any vaccinations in the past 4 weeks?   No     Immunization questionnaire answers were all negative.      Injection of any vaccines given by Jelani Reyes MA. Patient instructed to remain in clinic for 15 minutes afterwards, and to report any adverse reactions.     Screening performed by Jelani Reyes MA on 5/26/2023 at 12:44  PM.

## 2023-06-05 PROBLEM — G89.29 CHRONIC RIGHT-SIDED LOW BACK PAIN WITHOUT SCIATICA: Status: ACTIVE | Noted: 2023-06-05

## 2023-06-05 PROBLEM — M54.50 CHRONIC RIGHT-SIDED LOW BACK PAIN WITHOUT SCIATICA: Status: ACTIVE | Noted: 2023-06-05

## 2023-06-08 ENCOUNTER — TELEPHONE (OUTPATIENT)
Dept: FAMILY MEDICINE | Facility: CLINIC | Age: 38
End: 2023-06-08

## 2023-06-08 DIAGNOSIS — R22.2 MASS ON BACK: ICD-10-CM

## 2023-06-08 DIAGNOSIS — M54.50 CHRONIC RIGHT-SIDED LOW BACK PAIN WITHOUT SCIATICA: Primary | ICD-10-CM

## 2023-06-08 DIAGNOSIS — G89.29 CHRONIC RIGHT-SIDED LOW BACK PAIN WITHOUT SCIATICA: Primary | ICD-10-CM

## 2023-06-08 NOTE — TELEPHONE ENCOUNTER
Called and relayed provider message to patient.    Dr. Higgins-- Patient would like referral to spine Dr (referral pended). Patient would also like a call from you to discuss these results and reiterate the conservative management discussed at his appointment (I went over information from the visit note, he would still like a call from you).     Please close encounter when complete.

## 2023-06-08 NOTE — TELEPHONE ENCOUNTER
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  He also has misalignment of the fourth lumbar bone.  We could continue conservative management as discussed at her appointment, or I can also refer him to the spine doctor to see if there are any other interventions that can be done.  Let me know if you would like 1 and I can place a referral.

## 2023-06-13 NOTE — TELEPHONE ENCOUNTER
Called and spoke with patient's regarding conservative management and x-ray findings.  Patient would like referral to spine and referral to PT.  Also reports that he is going to a chiropractor, reports that he is shared his x-ray findings with his chiropractor.

## 2023-07-23 ENCOUNTER — HEALTH MAINTENANCE LETTER (OUTPATIENT)
Age: 38
End: 2023-07-23

## 2024-09-15 ENCOUNTER — HEALTH MAINTENANCE LETTER (OUTPATIENT)
Age: 39
End: 2024-09-15

## 2025-05-27 ENCOUNTER — OFFICE VISIT (OUTPATIENT)
Dept: URGENT CARE | Facility: URGENT CARE | Age: 40
End: 2025-05-27

## 2025-05-27 ENCOUNTER — ANCILLARY PROCEDURE (OUTPATIENT)
Dept: GENERAL RADIOLOGY | Facility: CLINIC | Age: 40
End: 2025-05-27
Attending: FAMILY MEDICINE

## 2025-05-27 VITALS
HEART RATE: 59 BPM | RESPIRATION RATE: 18 BRPM | HEIGHT: 76 IN | BODY MASS INDEX: 29.1 KG/M2 | WEIGHT: 239 LBS | SYSTOLIC BLOOD PRESSURE: 116 MMHG | OXYGEN SATURATION: 99 % | DIASTOLIC BLOOD PRESSURE: 71 MMHG | TEMPERATURE: 98 F

## 2025-05-27 DIAGNOSIS — S62.656A CLOSED NONDISPLACED FRACTURE OF MIDDLE PHALANX OF RIGHT LITTLE FINGER, INITIAL ENCOUNTER: ICD-10-CM

## 2025-05-27 DIAGNOSIS — S69.91XA INJURY OF FINGER OF RIGHT HAND, INITIAL ENCOUNTER: Primary | ICD-10-CM

## 2025-05-27 PROCEDURE — 73140 X-RAY EXAM OF FINGER(S): CPT | Mod: TC | Performed by: RADIOLOGY

## 2025-05-27 PROCEDURE — 99214 OFFICE O/P EST MOD 30 MIN: CPT | Performed by: FAMILY MEDICINE

## 2025-05-27 NOTE — PROGRESS NOTES
Urgent Care Clinic Visit    Chief Complaint   Patient presents with    Finger     Patient here with injury to his 4th and 5th digits of his right hand that happened Saturday when playing basketball.                5/27/2025    11:22 AM   Additional Questions   Roomed by Maddison Lindquist MA   Accompanied by self

## 2025-05-27 NOTE — PROGRESS NOTES
"SUBJECTIVE:  Chief Complaint   Patient presents with    Finger     Patient here with injury to his 4th and 5th digits of his right hand that happened Saturday when playing basketball.    .ident presents with a chief complaint of right finger  fifth.  The injury occurred 3 days ago.   The injury happened while playing.   How: sports related injury immediate pain  The patient complained of moderate pain and has had decreased ROM.    Pain exacerbated by movement        Past Medical History:   Diagnosis Date    Acne     Infective otitis externa, unspecified laterality      No Known Allergies  Social History     Tobacco Use    Smoking status: Never    Smokeless tobacco: Never   Substance Use Topics    Alcohol use: No       ROSINTEGUMENTARY/SKIN: NEGATIVE for open wound/bleeding and NEGATIVE for bruising    EXAM: /71 (BP Location: Right arm, Patient Position: Sitting, Cuff Size: Adult Regular)   Pulse 59   Temp 98  F (36.7  C) (Tympanic)   Resp 18   Ht 1.93 m (6' 4\")   Wt 108.4 kg (239 lb)   SpO2 99%   BMI 29.09 kg/m    Gen: healthy,alert,no distress  Extremity: finger has pain with palpation and rom.   There is not compromise to the distal circulation.  Pulses are +2 and CRT is brisk.  EXTREMITIES: peripheral pulses normal  SKIN: no suspicious lesions or rashes  NEURO: Normal strength and tone, sensory exam grossly normal, mentation intact and speech normal    X-RAY rt 5th digit non displace fx, read by Dr. Adonay Kebede      ICD-10-CM    1. Injury of finger of right hand, initial encounter  S69.91XA XR Finger Right G/E 2 Views     Orthopedic  Referral      2. Closed nondisplaced fracture of middle phalanx of right little finger, initial encounter  S62.656A Orthopedic  Referral        Spl[place]  [place]  RICE    "